# Patient Record
Sex: MALE | Race: OTHER | NOT HISPANIC OR LATINO | URBAN - METROPOLITAN AREA
[De-identification: names, ages, dates, MRNs, and addresses within clinical notes are randomized per-mention and may not be internally consistent; named-entity substitution may affect disease eponyms.]

---

## 2023-03-03 ENCOUNTER — EMERGENCY (EMERGENCY)
Facility: HOSPITAL | Age: 28
LOS: 1 days | Discharge: HOME | End: 2023-03-03
Attending: EMERGENCY MEDICINE | Admitting: EMERGENCY MEDICINE
Payer: OTHER MISCELLANEOUS

## 2023-03-03 VITALS
DIASTOLIC BLOOD PRESSURE: 91 MMHG | SYSTOLIC BLOOD PRESSURE: 146 MMHG | WEIGHT: 177.91 LBS | OXYGEN SATURATION: 96 % | HEART RATE: 96 BPM | RESPIRATION RATE: 16 BRPM | TEMPERATURE: 98 F | HEIGHT: 66 IN

## 2023-03-03 PROCEDURE — 99053 MED SERV 10PM-8AM 24 HR FAC: CPT

## 2023-03-03 PROCEDURE — 99284 EMERGENCY DEPT VISIT MOD MDM: CPT
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
## 2023-03-04 PROCEDURE — 99283 EMERGENCY DEPT VISIT LOW MDM: CPT

## 2023-03-04 PROCEDURE — 73030 X-RAY EXAM OF SHOULDER: CPT

## 2023-03-04 PROCEDURE — 73030 X-RAY EXAM OF SHOULDER: CPT | Mod: 26

## 2023-03-04 RX ORDER — EMTRICITABINE AND TENOFOVIR DISOPROXIL FUMARATE 200; 300 MG/1; MG/1
1 TABLET, FILM COATED ORAL ONCE
Refills: 0 | Status: COMPLETED | OUTPATIENT
Start: 2023-03-04 | End: 2023-03-04

## 2023-03-04 RX ORDER — RALTEGRAVIR 400 MG/1
1 TABLET, FILM COATED ORAL
Qty: 60 | Refills: 0
Start: 2023-03-04 | End: 2023-04-02

## 2023-03-04 RX ORDER — EMTRICITABINE AND TENOFOVIR DISOPROXIL FUMARATE 200; 300 MG/1; MG/1
1 TABLET, FILM COATED ORAL
Qty: 30 | Refills: 0
Start: 2023-03-04 | End: 2023-04-02

## 2023-03-04 RX ORDER — RALTEGRAVIR 400 MG/1
400 TABLET, FILM COATED ORAL ONCE
Refills: 0 | Status: COMPLETED | OUTPATIENT
Start: 2023-03-04 | End: 2023-03-04

## 2023-03-04 RX ORDER — IBUPROFEN 200 MG
600 TABLET ORAL ONCE
Refills: 0 | Status: COMPLETED | OUTPATIENT
Start: 2023-03-04 | End: 2023-03-04

## 2023-03-04 RX ADMIN — RALTEGRAVIR 400 MILLIGRAM(S): 400 TABLET, FILM COATED ORAL at 00:43

## 2023-03-04 RX ADMIN — EMTRICITABINE AND TENOFOVIR DISOPROXIL FUMARATE 1 TABLET(S): 200; 300 TABLET, FILM COATED ORAL at 00:43

## 2023-03-04 RX ADMIN — Medication 600 MILLIGRAM(S): at 00:43

## 2023-03-04 RX ADMIN — Medication 600 MILLIGRAM(S): at 01:06

## 2023-03-04 NOTE — ED PROVIDER NOTE - NEUROLOGICAL SPEECH
DM (diabetes mellitus) Nutrition, metabolism, and development symptoms Nutrition, metabolism, and development symptoms Nutrition, metabolism, and development symptoms clear DM (diabetes mellitus) normal... DM (diabetes mellitus) Nutrition, metabolism, and development symptoms

## 2023-03-04 NOTE — ED PROVIDER NOTE - NSFOLLOWUPINSTRUCTIONS_ED_ALL_ED_FT
The chances of becoming infected if HIV-infected blood is splashed in the eye, nose, or mouth are about  0.1%, 1 out of 1,000.    Shoulder Sprain    WHAT YOU NEED TO KNOW:    A shoulder sprain happens when a ligament in your shoulder is stretched or torn. Ligaments are the tough tissues that connect bones. Ligaments allow you to lift, lower, and rotate your arm.    DISCHARGE INSTRUCTIONS:    Return to the emergency department if:   •You feel severe pain in your shoulder when you move it, or it is touched.    •Your skin feels cold or clammy.    •You have numbness, tingling, or a feeling of pins and needles in your shoulder.     •The skin on your injured shoulder looks blue or pale.    Call your doctor if:   •You have new or increased swelling and pain in your shoulder.    •You have new or increased stiffness when you move your injured shoulder.    •Your symptoms do not improve within 5 to 7 days.     •You have questions or concerns about your condition or care.    Medicines: You may need any of the following:   •Acetaminophen decreases pain and fever. It is available without a doctor's order. Ask how much to take and how often to take it. Follow directions. Read the labels of all other medicines you are using to see if they also contain acetaminophen, or ask your doctor or pharmacist. Acetaminophen can cause liver damage if not taken correctly.    •NSAIDs, such as ibuprofen, help decrease swelling, pain, and fever. This medicine is available with or without a doctor's order. NSAIDs can cause stomach bleeding or kidney problems in certain people. If you take blood thinner medicine, always ask your healthcare provider if NSAIDs are safe for you. Always read the medicine label and follow directions.    •Prescription pain medicine may be given. Ask your healthcare provider how to take this medicine safely. Some prescription pain medicines contain acetaminophen. Do not take other medicines that contain acetaminophen without talking to your healthcare provider. Too much acetaminophen may cause liver damage. Prescription pain medicine may cause constipation. Ask your healthcare provider how to prevent or treat constipation.     •Take your medicine as directed. Contact your healthcare provider if you think your medicine is not helping or if you have side effects. Tell your provider if you are allergic to any medicine. Keep a list of the medicines, vitamins, and herbs you take. Include the amounts, and when and why you take them. Bring the list or the pill bottles to follow-up visits. Carry your medicine list with you in case of an emergency.    Follow up with your doctor as directed: Write down your questions so you remember to ask them during your visits.    Self-care:   •Rest your shoulder so it can heal. Avoid moving your shoulder as your injury heals. This will help decrease the risk of more damage to your shoulder.    •Apply ice on your shoulder for 20 to 30 minutes every 2 hours or as directed. Use an ice pack, or put crushed ice in a plastic bag. Cover it with a towel before you apply it to your shoulder. Ice helps prevent tissue damage and decreases swelling and pain.    •Compress your shoulder as directed. Compression provides support and helps decrease swelling and movement so your shoulder can heal. For mild sprains, you may be given a sling to support your arm. You may need a padded brace or a plaster cast to hold your shoulder in place if the sprain is more serious.    Physical therapy: A physical therapist teaches you exercises to help improve movement and strength, and to decrease pain.    Prevent another injury:   •Do not exercise when you are tired or in pain. Warm up and stretch before you exercise.    •Wear equipment to protect yourself when you play sports.    •Wear shoes that fit well and run on flat surfaces to prevent falls.      Body Fluid Exposure Information    People come in contact with blood and other body fluids in many ways. Sometimes, body fluids may have germs (bacteria or viruses) that can cause infections. These germs can be spread when an infected person's body fluids come in contact with the mouth, nose, eyes, genitals, or broken skin of another person. Broken skin includes chapped skin, cuts, abrasions, or irritation and swelling of the skin (dermatitis).    You are more likely to be exposed to infected body fluids if you:  •Are a health care worker or family member who is taking care of a sick person.    •Use needles to inject drugs, and you share needles with other users.    •Have sex or engage in other sexual activities without using a condom or other protection.    The risk of an infection spreading through exposure to body fluids is small and depends on several factors. These include:  •The type of body fluid.    •How you were exposed to the body fluid.    •The type of infection.    •The risk factors of the person who is the source of the body fluid. Your health care provider can help you assess the risk.    What types of body fluids can spread infection?    The following body fluids can spread infections:  •Blood    •Semen.    •Vaginal secretions.    •Urine.    •Feces.    •Saliva.    •Nasal or eye discharge.    •Breast milk.    •Amniotic fluid.    •Fluids surrounding body organs.    •Pus or other fluids coming from a wound.    What are some first-aid measures for body fluid exposure?    The following steps should be taken as soon as possible after you are exposed to body fluids:    Intact skin     •For contact with closed skin, wash the area with soap and water. If soap and water are not available, use hand .    Broken skin   •For contact with broken skin:  •Let the area bleed a little.    •Wash the area well with soap and water. If soap is not available, use just water or hand .    •Place a bandage or clean towel on the wound and apply gentle pressure to stop the bleeding. Do not squeeze or rub the area.    Do not use harsh chemicals such as bleach or iodine.    Eyes     •Rinse your eyes with water or saline solution for 30 seconds or longer.    •If you are wearing contact lenses, leave the contact lenses in while rinsing your eyes. After the rinsing is complete, remove the contact lenses.    Mouth     •Spit out the fluids. Rinse with water 4–5 times, spitting it out each time.    When should I seek help?    After performing first aid:  •Call your health care provider or seek emergency care right away if blood or other body fluids made contact with broken skin or the eyes, nose, mouth, or genitals.    •Tell your work supervisor immediately if the exposure to body fluid happened in the workplace. Follow your company's procedures for dealing with body fluid exposure.    What will happen after I report the exposure?    Your health care provider will ask you several questions, including questions about:  •Your medical history, including vaccine records.    •Date and time of the exposure.    •Whether you saw body fluids during the exposure.    •Type of body fluid you were exposed to.    •Volume of body fluid you were exposed to.    •How the exposure happened.    •Any devices used, such as needles.    •The area of your body that made contact with the body fluid.    •Any injury to your skin or other areas.    •How long contact was made with the body fluid.    •Whether the person whose body fluid you were exposed to has certain risk factors or health conditions, if known.    Your health care provider will assess your risk for infection. Often, no treatment is necessary. However, in some cases:  •Your health care provider may recommend doing blood tests right away.    •Follow-up blood tests may also be done at certain times during the upcoming weeks and months to check for any changes.    •You may be offered treatment to prevent infection after exposure (post-exposure prophylaxis). This may include certain vaccines or medicines. This is necessary when there is a risk of a serious infection, such as HIV (human immunodeficiency virus) or hepatitis B. Your health care provider will discuss the right treatment and vaccines with you.    How can I prevent infection?    To reduce your chances of getting an infection     •Make sure your vaccines are up to date, including vaccines for tetanus and hepatitis.    •Learn and follow any guidelines for preventing exposure (universal precautions) that are provided at your workplace.    •Keep all follow-up visits as told by your health care provider. This is important. You will need to be monitored after you are evaluated for exposure to body fluids.    To avoid spreading infection to others      •Wash your hands frequently with soap and water. If soap and water are not available, use hand .    • Do not share toothbrushes, razors, dental floss, or other personal items.    •Keep open wounds covered.    •Dispose of any items with blood on them by putting them in the trash. This includes razors, tampons, and bandages.    • Do not have sex or engage in sexual activities until you know you are free of infection.    • Do not donate blood, plasma, breast milk, sperm, or other body fluids.    • Do not share drug supplies with others. These include needles, syringes, straws, and pipes.    •Follow all instructions from your health care provider for preventing the spread of infection.    Summary    •Contact with blood and other body fluids can happen in many ways. Sometimes, body fluids may have germs that can cause an infection.    •Treatment depends on the type of body fluid you were exposed to and what part of your body was exposed.    •Call your health care provider or seek emergency care right away if blood or other body fluids made contact with broken skin or the eyes, nose, mouth, or genitals.    •Make sure all your vaccines are up to date, including vaccines for tetanus and hepatitis.    This information is not intended to replace advice given to you by your health care provider. Make sure you discuss any questions you have with your health care provider.

## 2023-03-04 NOTE — ED PROVIDER NOTE - PATIENT PORTAL LINK FT
You can access the FollowMyHealth Patient Portal offered by [facility] by registering at the following website: [email or web address]. By joining [facility]’s FollowMyHealth portal, you will also be able to view your health information using other applications (apps) compatible with our system.

## 2023-03-04 NOTE — ED PROVIDER NOTE - MUSCULOSKELETAL [+], MLM
Letter printed and faxed to [facility] at [phone]. Fax confirmation received.
Per patient, Dr. La Valente needs to send a letter requesting imagine from [facility] for patient's 5/21/2019 appointment. Pended letter for provider review and routed to provider.
JOINT PAIN
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
## 2023-03-04 NOTE — ED PROVIDER NOTE - IV ALTEPLASE INCLUSION HIDDEN
show Oxybutynin Pregnancy And Lactation Text: This medication is Pregnancy Category B and is considered safe during pregnancy. It is unknown if it is excreted in breast milk.

## 2023-03-04 NOTE — ED PROVIDER NOTE - PROGRESS NOTE DETAILS
no fractures on xray, recommend ice, nsaids  I have discussed the discharge plan with the patient. The patient agrees with the plan, as discussed.  The patient understands Emergency Department diagnosis is a preliminary diagnosis often based on limited information and that the patient must adhere to the follow-up plan as discussed.  The patient understands that if the symptoms worsen or if prescribed medications do not have the desired/planned effect that the patient may return to the Emergency Department at any time for further evaluation and treatment.
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
## 2023-03-07 DIAGNOSIS — Z88.0 ALLERGY STATUS TO PENICILLIN: ICD-10-CM

## 2023-03-07 DIAGNOSIS — M25.512 PAIN IN LEFT SHOULDER: ICD-10-CM
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
## 2023-03-07 DIAGNOSIS — S43.402A UNSPECIFIED SPRAIN OF LEFT SHOULDER JOINT, INITIAL ENCOUNTER: ICD-10-CM

## 2023-03-07 DIAGNOSIS — Z77.21 CONTACT WITH AND (SUSPECTED) EXPOSURE TO POTENTIALLY HAZARDOUS BODY FLUIDS: ICD-10-CM
